# Patient Record
Sex: FEMALE | Race: WHITE | NOT HISPANIC OR LATINO | URBAN - METROPOLITAN AREA
[De-identification: names, ages, dates, MRNs, and addresses within clinical notes are randomized per-mention and may not be internally consistent; named-entity substitution may affect disease eponyms.]

---

## 2021-01-11 ENCOUNTER — OUTPATIENT (OUTPATIENT)
Dept: OUTPATIENT SERVICES | Facility: HOSPITAL | Age: 86
LOS: 1 days | End: 2021-01-11

## 2021-10-11 ENCOUNTER — APPOINTMENT (OUTPATIENT)
Dept: OPHTHALMOLOGY | Facility: CLINIC | Age: 86
End: 2021-10-11

## 2021-10-18 ENCOUNTER — NON-APPOINTMENT (OUTPATIENT)
Age: 86
End: 2021-10-18

## 2021-10-18 ENCOUNTER — APPOINTMENT (OUTPATIENT)
Dept: OPHTHALMOLOGY | Facility: CLINIC | Age: 86
End: 2021-10-18
Payer: MEDICARE

## 2021-10-18 PROCEDURE — 92014 COMPRE OPH EXAM EST PT 1/>: CPT

## 2021-10-18 PROCEDURE — 92134 CPTRZ OPH DX IMG PST SGM RTA: CPT

## 2022-04-25 ENCOUNTER — NON-APPOINTMENT (OUTPATIENT)
Age: 87
End: 2022-04-25

## 2022-04-25 ENCOUNTER — APPOINTMENT (OUTPATIENT)
Dept: OPHTHALMOLOGY | Facility: CLINIC | Age: 87
End: 2022-04-25
Payer: MEDICARE

## 2022-04-25 PROCEDURE — 92014 COMPRE OPH EXAM EST PT 1/>: CPT

## 2022-04-25 PROCEDURE — 92134 CPTRZ OPH DX IMG PST SGM RTA: CPT

## 2022-05-02 ENCOUNTER — APPOINTMENT (OUTPATIENT)
Dept: ORTHOPEDIC SURGERY | Facility: CLINIC | Age: 87
End: 2022-05-02
Payer: MEDICARE

## 2022-05-02 VITALS — WEIGHT: 182 LBS | BODY MASS INDEX: 33.49 KG/M2 | HEIGHT: 62 IN

## 2022-05-02 DIAGNOSIS — M25.539 PAIN IN UNSPECIFIED WRIST: ICD-10-CM

## 2022-05-02 DIAGNOSIS — E78.00 PURE HYPERCHOLESTEROLEMIA, UNSPECIFIED: ICD-10-CM

## 2022-05-02 DIAGNOSIS — Z78.9 OTHER SPECIFIED HEALTH STATUS: ICD-10-CM

## 2022-05-02 DIAGNOSIS — I10 ESSENTIAL (PRIMARY) HYPERTENSION: ICD-10-CM

## 2022-05-02 DIAGNOSIS — Z87.39 PERSONAL HISTORY OF OTHER DISEASES OF THE MUSCULOSKELETAL SYSTEM AND CONNECTIVE TISSUE: ICD-10-CM

## 2022-05-02 PROBLEM — Z00.00 ENCOUNTER FOR PREVENTIVE HEALTH EXAMINATION: Status: ACTIVE | Noted: 2022-05-02

## 2022-05-02 PROCEDURE — 20605 DRAIN/INJ JOINT/BURSA W/O US: CPT

## 2022-05-02 PROCEDURE — 99214 OFFICE O/P EST MOD 30 MIN: CPT | Mod: 25

## 2022-05-02 RX ORDER — SIMVASTATIN 40 MG/1
40 TABLET, FILM COATED ORAL
Qty: 90 | Refills: 0 | Status: ACTIVE | COMMUNITY
Start: 2022-03-05

## 2022-05-02 RX ORDER — FUROSEMIDE 40 MG/1
40 TABLET ORAL
Qty: 90 | Refills: 0 | Status: ACTIVE | COMMUNITY
Start: 2021-12-11

## 2022-05-02 RX ORDER — PANTOPRAZOLE 40 MG/1
40 TABLET, DELAYED RELEASE ORAL
Qty: 90 | Refills: 0 | Status: ACTIVE | COMMUNITY
Start: 2021-06-23

## 2022-05-02 RX ORDER — METOPROLOL SUCCINATE 200 MG/1
200 TABLET, EXTENDED RELEASE ORAL
Qty: 90 | Refills: 0 | Status: ACTIVE | COMMUNITY
Start: 2021-05-28

## 2022-05-02 RX ORDER — TERAZOSIN 2 MG/1
2 CAPSULE ORAL
Qty: 90 | Refills: 0 | Status: ACTIVE | COMMUNITY
Start: 2022-03-05

## 2022-05-02 NOTE — HISTORY OF PRESENT ILLNESS
[de-identified] : 87F, RHD, PMHX of Hypertension, Arthritis, presents with sudden onset of pain in the right wrist from 3/29/22. Denies injury/trauma. She reports ace wrapping the wrist/hand not knowing the cause of pain. Admits to some swelling. Has tried Tylenol w/ minimal relief. Admits to Ice w/o relief. Denies outside imaging/treatment.\par \par 5/2/22: f/u right wrist. Reports symptoms are getting minimally better - but still having some pain. Admits that if movement occurs, is writing, lifting items will have pain. at rest, starting to diminish. No constitutional symptoms. Admits medrol dose onesimo helped minimally.

## 2022-05-02 NOTE — IMAGING
[de-identified] : Right wrist with no swelling nor erythema. +ttp at thumb CMC/STT. -ttp fovea and SL interval. -ttp at radial styloid nor scaphoid. Able to make fist, oppose thumb to small finger and abduct fingers. Sensation intact throughout. <2sec cap refill.\par \par Right wrist radiographs with no fracture nor dislocation. Radiocarpal chondrocalcinosis. Advanced thumb CMC/STT arthrosis.

## 2022-05-02 NOTE — ASSESSMENT
[FreeTextEntry1] : Right thumb CMC/STT arthritis - reviewed radiographs. Discussed treatment ladder including NSAIDs, bracing (shown MetaGrip), hand therapy (actively doing with little relief), CSI and basal joint arthroplasty (discussed - patient will hold off as long as possible). After discussion of risks/benefits, patient elected to proceed with CSI to right thumb.\par \par Procedure 1 - 0.5cc 1%lidocaine + 0.5cc 40mg/cc Kenalog administered to right thumb CMC joint following sterilization with Betadine. Patient tolerated this well.\par \par F/u 6mo (previous pseudogout, seeing rheum)

## 2022-08-16 ENCOUNTER — NON-APPOINTMENT (OUTPATIENT)
Age: 87
End: 2022-08-16

## 2022-08-16 ENCOUNTER — APPOINTMENT (OUTPATIENT)
Dept: PULMONOLOGY | Facility: CLINIC | Age: 87
End: 2022-08-16

## 2022-08-16 VITALS
DIASTOLIC BLOOD PRESSURE: 68 MMHG | SYSTOLIC BLOOD PRESSURE: 114 MMHG | HEIGHT: 62 IN | HEART RATE: 74 BPM | OXYGEN SATURATION: 94 % | WEIGHT: 195 LBS | BODY MASS INDEX: 35.88 KG/M2 | TEMPERATURE: 97.6 F

## 2022-08-16 PROCEDURE — 94010 BREATHING CAPACITY TEST: CPT

## 2022-08-16 PROCEDURE — 99214 OFFICE O/P EST MOD 30 MIN: CPT | Mod: 25

## 2022-08-16 RX ORDER — GABAPENTIN 300 MG/1
300 CAPSULE ORAL
Qty: 60 | Refills: 0 | Status: ACTIVE | COMMUNITY
Start: 2022-05-17

## 2022-08-16 RX ORDER — ALLOPURINOL 100 MG/1
100 TABLET ORAL
Qty: 30 | Refills: 0 | Status: ACTIVE | COMMUNITY
Start: 2022-07-26

## 2022-08-16 NOTE — DISCUSSION/SUMMARY
[FreeTextEntry1] : This 11 Mojgan has mild acute bronchitis.  Her sputum is now clear but she still has shortness of breath and wheezing.  She is been on 2 course of antibiotics and I do not think a third course is indicated.  I have started her on a Medrol Dosepak.  I have also started her on Breo 100 mg 1 spray daily.  She will continue albuterol via nebulizer as needed.\par The patient understands and agrees with plan of care.\par Today's office visit encompassed 32 minutes. I conducted an extensive history ,physical exam and reviewed diagnosis and treatment options  including diagnostic tests,radiologic studies including  cat scans  and the use of prescription medication.

## 2022-08-16 NOTE — PROCEDURE
[FreeTextEntry1] : Spirometry performed 8/16/2022 reveals mild obstructive airways disease\par Chest x-ray performed July 2022 is normal

## 2022-08-16 NOTE — REASON FOR VISIT
[Initial] : an initial visit [Asthma] : asthma [Cough] : cough [Shortness of Breath] : shortness of breath [Wheezing] : wheezing

## 2022-08-16 NOTE — HISTORY OF PRESENT ILLNESS
[Former] : former [< 20 pack-years] : < 20 pack-years [Never] : never [TextBox_4] : 87 female hx asthma for fu visit\par for past 2 months white mucous initially yellow\par no fever no chest pain co sl pressure  and shortness of breath\par sl wheeze\par worsening dyspnea on exertion\par PMD  treated Z-Eduard and no improvement and doxycyline and no change\par no ill contacts\par precipitating factors none\par +vaccine covid\par

## 2022-08-16 NOTE — PHYSICAL EXAM
[No Acute Distress] : no acute distress [Normal Oropharynx] : normal oropharynx [Normal Appearance] : normal appearance [No Neck Mass] : no neck mass [Normal Rate/Rhythm] : normal rate/rhythm [Normal S1, S2] : normal s1, s2 [No Murmurs] : no murmurs [No Resp Distress] : no resp distress [Clear to Auscultation Bilaterally] : clear to auscultation bilaterally [No Abnormalities] : no abnormalities [Benign] : benign [Normal Gait] : normal gait [No Clubbing] : no clubbing [No Cyanosis] : no cyanosis [No Edema] : no edema [FROM] : FROM [Normal Color/ Pigmentation] : normal color/ pigmentation [No Focal Deficits] : no focal deficits [Oriented x3] : oriented x3 [Normal Affect] : normal affect [TextBox_68] : Faint expiratory wheezing bilaterally [TextBox_105] : Chronic edema lower extremities

## 2022-08-30 ENCOUNTER — APPOINTMENT (OUTPATIENT)
Dept: PULMONOLOGY | Facility: CLINIC | Age: 87
End: 2022-08-30

## 2022-08-30 VITALS
SYSTOLIC BLOOD PRESSURE: 114 MMHG | DIASTOLIC BLOOD PRESSURE: 70 MMHG | TEMPERATURE: 97.6 F | HEIGHT: 62 IN | WEIGHT: 195 LBS | BODY MASS INDEX: 35.88 KG/M2 | OXYGEN SATURATION: 93 % | HEART RATE: 87 BPM

## 2022-08-30 DIAGNOSIS — J20.9 ACUTE BRONCHITIS, UNSPECIFIED: ICD-10-CM

## 2022-08-30 PROCEDURE — 99214 OFFICE O/P EST MOD 30 MIN: CPT

## 2022-08-30 NOTE — REASON FOR VISIT
[Follow-Up] : a follow-up visit [Asthma] : asthma [Cough] : cough [Shortness of Breath] : shortness of breath [Wheezing] : wheezing [TextBox_44] : 2 weeks,

## 2022-08-30 NOTE — DISCUSSION/SUMMARY
[FreeTextEntry1] : Ms Can has residual cough and yellow phlegm.  Her lungs are clear and she is not having any other symptoms.  I prefer not to restart her on any inhalers.  I have asked her to take Mucinex DM 1 tablet twice a day until the symptoms resolved.  It is likely residual from a viral infection.\par The patient understands and agrees with plan of care.\par Today's office visit encompassed 32 minutes. I conducted an extensive history ,physical exam and reviewed diagnosis and treatment options  including diagnostic tests,radiologic studies including  cat scans  and the use of prescription medication.

## 2022-08-30 NOTE — HISTORY OF PRESENT ILLNESS
[Former] : former [< 20 pack-years] : < 20 pack-years [Never] : never [TextBox_4] : 87 female hx bronchiitis for fu \par still cough with yellow sputum \par no fever  no blood  no chest pain n tightness\par found sl improvement with  breo\par  marked improvement with prednisone

## 2022-10-17 ENCOUNTER — APPOINTMENT (OUTPATIENT)
Dept: OPHTHALMOLOGY | Facility: CLINIC | Age: 87
End: 2022-10-17

## 2022-10-17 ENCOUNTER — NON-APPOINTMENT (OUTPATIENT)
Age: 87
End: 2022-10-17

## 2022-10-17 PROCEDURE — 92014 COMPRE OPH EXAM EST PT 1/>: CPT

## 2022-10-17 PROCEDURE — 92134 CPTRZ OPH DX IMG PST SGM RTA: CPT

## 2022-11-29 ENCOUNTER — APPOINTMENT (OUTPATIENT)
Dept: PULMONOLOGY | Facility: CLINIC | Age: 87
End: 2022-11-29

## 2022-11-29 VITALS
HEART RATE: 75 BPM | HEIGHT: 62 IN | TEMPERATURE: 96.9 F | DIASTOLIC BLOOD PRESSURE: 68 MMHG | OXYGEN SATURATION: 96 % | BODY MASS INDEX: 35.88 KG/M2 | SYSTOLIC BLOOD PRESSURE: 128 MMHG | WEIGHT: 195 LBS

## 2022-11-29 PROCEDURE — 99214 OFFICE O/P EST MOD 30 MIN: CPT

## 2022-11-29 NOTE — HISTORY OF PRESENT ILLNESS
[TextBox_4] : 87 female recent admission to [facility] for left leg cellulitis and dyspnea and possible pneumonia\par treated abx and lasix x 1  and dced \par today feels better breathing improved on prednisone 50 mg qd for possible \par no fever  no chest pain no tightness no cough no phlegm\par full activity with walker\par no orthopnea\par

## 2022-11-29 NOTE — DISCUSSION/SUMMARY
[FreeTextEntry1] : Ms Can was recently admitted to the hospital for cellulitis and dyspnea.  CAT scan of the chest showed bilateral small patchy infiltrates.  Clear whether it was a atypical pneumonia versus pulmonary edema versus crypto genic organizing pneumonia.  The x-ray appearance is not that of pulmonary edema.  She did respond quickly to 1 dose of diuretics.  The patient was discharged on 50 mg of prednisone a day for possible cryptogenic organizing pneumonia.  This usually is intermittent infiltrates which moved to different areas of the lungs.  Is usually diagnosis of exclusion or pathologic diagnosis.  I have asked the patient to complete the 10-day course of prednisone.  She was given Symbicort which I do not think she needs and she will not use it.  She will also complete the oral antibiotics.  She return here in approxi-1 month's time with a chest x-ray to check on her status and see if the infiltrates have resolved.\par The patient understands and agrees with plan of care.\par Today's office visit encompassed 32 minutes. I conducted an extensive history ,physical exam and reviewed diagnosis and treatment options  including diagnostic tests,radiologic studies including  cat scans  and the use of prescription medication.

## 2022-11-29 NOTE — REASON FOR VISIT
[Follow-Up - From Hospitalization] : a follow-up visit after a recent hospitalization [TextBox_44] : Patient went to [facility] 11/23 and was discharged on 11/26. She had cellulitis. They discovered she had a breathing issue due to previously having pneumonia.

## 2022-11-30 ENCOUNTER — APPOINTMENT (OUTPATIENT)
Dept: CARE COORDINATION | Facility: HOME HEALTH | Age: 87
End: 2022-11-30

## 2022-11-30 DIAGNOSIS — J18.9 PNEUMONIA, UNSPECIFIED ORGANISM: ICD-10-CM

## 2022-11-30 DIAGNOSIS — Z95.0 PRESENCE OF CARDIAC PACEMAKER: ICD-10-CM

## 2022-11-30 DIAGNOSIS — L03.116 CELLULITIS OF LEFT LOWER LIMB: ICD-10-CM

## 2022-11-30 DIAGNOSIS — N18.30 CHRONIC KIDNEY DISEASE, STAGE 3 UNSPECIFIED: ICD-10-CM

## 2022-11-30 DIAGNOSIS — I50.32 CHRONIC DIASTOLIC (CONGESTIVE) HEART FAILURE: ICD-10-CM

## 2022-11-30 PROCEDURE — 99495 TRANSJ CARE MGMT MOD F2F 14D: CPT

## 2022-12-02 VITALS
DIASTOLIC BLOOD PRESSURE: 80 MMHG | HEART RATE: 81 BPM | RESPIRATION RATE: 16 BRPM | SYSTOLIC BLOOD PRESSURE: 140 MMHG | OXYGEN SATURATION: 93 %

## 2022-12-02 PROBLEM — N18.30 CKD (CHRONIC KIDNEY DISEASE), STAGE III: Status: ACTIVE | Noted: 2022-12-02

## 2022-12-02 PROBLEM — L03.116 CELLULITIS OF LEFT LOWER EXTREMITY: Status: ACTIVE | Noted: 2022-12-02

## 2022-12-02 PROBLEM — Z95.0 PRESENCE OF CARDIAC PACEMAKER: Status: ACTIVE | Noted: 2022-05-02

## 2022-12-02 PROBLEM — J18.9 PNEUMONIA: Status: ACTIVE | Noted: 2022-12-02

## 2022-12-02 PROBLEM — I50.32 CHRONIC DIASTOLIC CONGESTIVE HEART FAILURE: Status: ACTIVE | Noted: 2022-12-02

## 2022-12-02 RX ORDER — DOXYCYCLINE HYCLATE 100 MG/1
100 TABLET ORAL
Qty: 14 | Refills: 0 | Status: DISCONTINUED | COMMUNITY
Start: 2022-08-12 | End: 2022-12-02

## 2022-12-02 RX ORDER — METHYLPREDNISOLONE 4 MG/1
4 TABLET ORAL
Qty: 21 | Refills: 0 | Status: DISCONTINUED | COMMUNITY
Start: 2022-04-01 | End: 2022-12-02

## 2022-12-02 RX ORDER — AMOXICILLIN AND CLAVULANATE POTASSIUM 875; 125 MG/1; MG/1
875-125 TABLET, COATED ORAL
Qty: 14 | Refills: 0 | Status: COMPLETED | COMMUNITY
Start: 2022-03-25 | End: 2022-12-02

## 2022-12-02 RX ORDER — METHYLPREDNISOLONE 4 MG/1
4 TABLET ORAL
Qty: 1 | Refills: 1 | Status: COMPLETED | COMMUNITY
Start: 2022-08-16 | End: 2022-12-02

## 2022-12-02 NOTE — PHYSICAL EXAM
[No Acute Distress] : no acute distress [Well Nourished] : well nourished [Well Developed] : well developed [No Respiratory Distress] : no respiratory distress  [Clear to Auscultation] : lungs were clear to auscultation bilaterally [No Accessory Muscle Use] : no accessory muscle use [Normal Rate] : normal rate  [Regular Rhythm] : with a regular rhythm [Normal S1, S2] : normal S1 and S2 [No Murmur] : no murmur heard [Pedal Pulses Present] : the pedal pulses are present [No Edema] : there was no peripheral edema [Normal Gait] : normal gait [Coordination Grossly Intact] : coordination grossly intact [Speech Grossly Normal] : speech grossly normal [Normal Affect] : the affect was normal [Alert and Oriented x3] : oriented to person, place, and time [Normal Mood] : the mood was normal

## 2022-12-02 NOTE — HISTORY OF PRESENT ILLNESS
[Post-hospitalization from ___ Hospital] : Post-hospitalization from [unfilled] Hospital [Admitted on: ___] : The patient was admitted on [unfilled] [Discharged on ___] : discharged on [unfilled] [Discharge Summary] : discharge summary [Discharge Med List] : discharge medication list [Med Reconciliation] : medication reconciliation has been completed [Patient Contacted By: ____] : and contacted by [unfilled] [FreeTextEntry2] : Copied from EMR,"HOSPITAL COURSE: Pt is an 87 year old female with past medical history of HTN, HLD, CKD (stage 3), chronic LE swelling bilaterally (on Furosemide), pacemaker, Asthma (not on O2), presented to the hospital for cellulitis of the left leg and hypoxic respiratory failure. Additionally, she was found to have VICK (Cr on admission 1.71 from baseline of ~1.3-1.4) which improved to 1.5 range on discharge. She was started on IV Ceftriaxone and IV Doxycyline initially, then changed to PO Doxycyline per ID recommendations for a total 7 day course. Her wound appearance improved over the course of the hospital stay, with less erythema and purulence noticed since admission, and normalization of WBC. Patient also with mild hypoxia on admission, possibly a component of volume overload. Responded well to one dose of IV lasix and then transitioned to home po regimen. Cardio consulted. No need for repeat TTE this admission. Pulm consulted as well given findings CT chest findings. They suspected  and recommend prednisone 50 mg daily x 10 days and close follow up with pulmonologist. Will send home with maintenance inhaler as well. Patient determined to not require home supplemental O2.\par Patient should follow up with wound care in 2-3 days for dressing of wound."\par \par Pt seen in her home for transitional care management She denies SOB, fever chills LLE without redness, edema, drainage. No cough, wheezing or fatigue reported.

## 2022-12-02 NOTE — ASSESSMENT
[FreeTextEntry1] : Mayelin Can is being seen after discharge home from [facility]. She was admitted on 11/24/22   for PNA/cellulitis LLE and discharged home on 11/26/22.  Discharge medications were reviewed and reconciled with the current medication list and medications in home.\par \par 1)PNA-no cough, fever, chills lungs clr\par -on Doxycycline, Cefdinir, Prednisone\par \par [facility] TCM teaching: Advised patient to adhere to all medications including demonstrating proper use of inhalers and nebulizers. Encourage the use of proper breathing techniques such as pursed lip breathing or leaning forward during exhalation. Patient understands proper use of oxygen therapy. Increase activity as tolerated and maintain optimal activity levels. Continue coughing and deep breathing exercises including use of Incentive Spirometry. Receive routine pneumococcal and influenza vaccinations. Identify early signs and sx of disease and notify NP/MD. Maintain proper nutrition and hydration. Follow up with Pulmonologist within 7 days of discharge. Contact information given, patient advised to call with any questions/concerns. \par \par 2)Cellulitis LLE-resolved\par Area pink not tender to touch, no redness or edema\par No drainage from leg\par Completing Cefsinir, Azithromycin\par +PP, no pedal edema\par \par Reminded of TCM program and encouraged pt to call with any questions or concerns and especially with worsening of symptoms. Verbalized understanding.\par

## 2022-12-16 ENCOUNTER — TRANSCRIPTION ENCOUNTER (OUTPATIENT)
Age: 87
End: 2022-12-16

## 2022-12-22 ENCOUNTER — TRANSCRIPTION ENCOUNTER (OUTPATIENT)
Age: 87
End: 2022-12-22

## 2023-01-05 ENCOUNTER — RESULT REVIEW (OUTPATIENT)
Age: 88
End: 2023-01-05

## 2023-01-06 ENCOUNTER — APPOINTMENT (OUTPATIENT)
Dept: PULMONOLOGY | Facility: CLINIC | Age: 88
End: 2023-01-06
Payer: MEDICARE

## 2023-01-06 VITALS
DIASTOLIC BLOOD PRESSURE: 82 MMHG | SYSTOLIC BLOOD PRESSURE: 145 MMHG | WEIGHT: 195 LBS | BODY MASS INDEX: 35.88 KG/M2 | HEIGHT: 62 IN | HEART RATE: 72 BPM | OXYGEN SATURATION: 88 % | TEMPERATURE: 97.9 F

## 2023-01-06 DIAGNOSIS — R91.8 OTHER NONSPECIFIC ABNORMAL FINDING OF LUNG FIELD: ICD-10-CM

## 2023-01-06 DIAGNOSIS — R60.0 LOCALIZED EDEMA: ICD-10-CM

## 2023-01-06 PROCEDURE — 99215 OFFICE O/P EST HI 40 MIN: CPT

## 2023-01-06 RX ORDER — AZITHROMYCIN 250 MG/1
250 TABLET, FILM COATED ORAL
Qty: 6 | Refills: 0 | Status: DISCONTINUED | COMMUNITY
Start: 2022-03-05 | End: 2023-01-06

## 2023-01-06 RX ORDER — PREDNISONE 50 MG/1
50 TABLET ORAL
Qty: 10 | Refills: 0 | Status: DISCONTINUED | COMMUNITY
Start: 2022-11-26 | End: 2023-01-06

## 2023-01-06 RX ORDER — DOXYCYCLINE HYCLATE 100 MG/1
100 CAPSULE ORAL
Qty: 10 | Refills: 0 | Status: DISCONTINUED | COMMUNITY
Start: 2022-11-26 | End: 2023-01-06

## 2023-01-06 RX ORDER — AMOXICILLIN 500 MG/1
500 TABLET, FILM COATED ORAL
Qty: 16 | Refills: 0 | Status: DISCONTINUED | COMMUNITY
Start: 2022-11-01 | End: 2023-01-06

## 2023-01-06 RX ORDER — GABAPENTIN 100 MG/1
100 CAPSULE ORAL
Qty: 180 | Refills: 0 | Status: DISCONTINUED | COMMUNITY
Start: 2021-06-14 | End: 2023-01-06

## 2023-01-06 RX ORDER — NITROFURANTOIN (MONOHYDRATE/MACROCRYSTALS) 25; 75 MG/1; MG/1
100 CAPSULE ORAL
Qty: 90 | Refills: 0 | Status: DISCONTINUED | COMMUNITY
Start: 2022-07-19 | End: 2023-01-06

## 2023-01-06 RX ORDER — CEFDINIR 300 MG/1
300 CAPSULE ORAL
Qty: 14 | Refills: 0 | Status: DISCONTINUED | COMMUNITY
Start: 2022-05-16 | End: 2023-01-06

## 2023-01-06 RX ORDER — COLCHICINE 0.6 MG/1
0.6 CAPSULE ORAL
Qty: 10 | Refills: 0 | Status: DISCONTINUED | COMMUNITY
Start: 2021-10-25 | End: 2023-01-06

## 2023-01-06 NOTE — PROCEDURE
[FreeTextEntry1] : Chest x-ray [facility] January 5, 2023 no report as of yet.  My review diffuse interstitial alveolar opacities little change from prior study in November 2022.\par Room air O2 saturation at rest 88%.  With exercise 74%.  On 3 L nasal cannula 90% with exercise.

## 2023-01-06 NOTE — HISTORY OF PRESENT ILLNESS
[Former] : former [Never] : never [TextBox_4] : 87 female hx tobacco  50 yr ago\par recent hosp for cellulitis and pneumonia ? and started on steroids which completed\par today remains dyspnea \par some ankle welling and took extra diuretic\par   ++cough no phlegm but feel congested\par not on home o2 but notes o2 sat 82 after activity

## 2023-01-06 NOTE — REASON FOR VISIT
[Follow-Up] : a follow-up visit [Asthma] : asthma [Pneumonia] : pneumonia [Cough] : cough [Shortness of Breath] : shortness of breath [TextBox_44] : 6 weeks, Chest xray completed. Pt complains of SOB during very minimal activity.  During todays visit pt O2 dropped to 74% while walking to exam room.

## 2023-01-06 NOTE — DISCUSSION/SUMMARY
[FreeTextEntry1] : Ms Can has a progressive bilateral infiltrates shortness of breath and hypoxemia.  The etiology is unclear.  She was in the hospital and thought she could have underlying cryptogenic organizing pneumonia and was placed on prednisone.  When she was on the prednisone she was feeling better.  The patient is 87 and I think is a high risk patient for any invasive i.e. biopsy therapy.  She has significant edema at this time as well.  The patient's O2 sat at rest is 88 but decreases to 74 with activity on room air.  I have restarted the prednisone at 20 mg daily.  She has her underlying renal disease and we do not want affect her kidneys.  She is taking Lasix 40 mg a day and I have asked her to take an extra 20 mg a day for the next 3 days.  She should follow-up with Dr. Darvin riggins or renal physician in 1 week.  I recommend a follow-up cardiology evaluation including an echocardiogram by Dr. Davenport.  We will start her oxygen at 3 L nasal cannula.  I have asked her to return in 1 week's time.  The patient is quite upset about the deterioration in her overall respiratory status but hopefully we will come with some solutions.

## 2023-01-13 ENCOUNTER — APPOINTMENT (OUTPATIENT)
Dept: PULMONOLOGY | Facility: CLINIC | Age: 88
End: 2023-01-13
Payer: MEDICARE

## 2023-01-13 VITALS
DIASTOLIC BLOOD PRESSURE: 90 MMHG | TEMPERATURE: 97.4 F | SYSTOLIC BLOOD PRESSURE: 138 MMHG | WEIGHT: 194 LBS | OXYGEN SATURATION: 93 % | BODY MASS INDEX: 35.7 KG/M2 | HEART RATE: 93 BPM | HEIGHT: 62 IN

## 2023-01-13 DIAGNOSIS — R09.02 HYPOXEMIA: ICD-10-CM

## 2023-01-13 PROCEDURE — 99214 OFFICE O/P EST MOD 30 MIN: CPT

## 2023-01-13 NOTE — DISCUSSION/SUMMARY
[FreeTextEntry1] : Ms Can has dyspnea and hypoxemia.  Her chest ache shows diffuse interstitial alveolar changes.  She has known to parent for 32 years and her  is involved in pigeon breathing.  I am concerned she has hypersensitivity pneumonitis.  I have asked her to obtain hypersensitivity pneumonitis panel which includes precipitins to the Pedro allergy.  She is feeling better on the prednisone 20 mg a day and we will continue this medication.  She did desaturate to 80 on her last visit but she is refusing supplemental oxygen.  We will continue the prednisone 20 mg a day and reevaluate her situation in 1 month.  We will likely obtain a chest x-ray in the future to see if any improvement and I await the precipitin panel.\par The patient understands and agrees with the plan of care.\par Today's office visit encompassed 42 minutes. I conducted an extensive history , physical exam and reviewed diagnosis and treatment options  including diagnostic tests radiological studies including cat scans  and the use of prescription medication.

## 2023-01-13 NOTE — HISTORY OF PRESENT ILLNESS
[Never] : never [TextBox_4] : 87 female with progressive anne  and infiltrates\par ++parrot x 32 yrs\par on prednisone  and feels much no chest pain no tightness\par no wheeze  no cough no fever no nite awakening\par refusing to use supplemental o2\par

## 2023-01-13 NOTE — REASON FOR VISIT
[Follow-Up] : a follow-up visit [Asthma] : asthma [Spouse] : spouse [TextBox_44] : 1 week,Patient wants to go over chest X-ray results from 1/5/23,hypoxia, interstitial lung disease, pulmonary infiltrates

## 2023-01-19 ENCOUNTER — NON-APPOINTMENT (OUTPATIENT)
Age: 88
End: 2023-01-19

## 2023-01-27 ENCOUNTER — APPOINTMENT (OUTPATIENT)
Dept: ORTHOPEDIC SURGERY | Facility: CLINIC | Age: 88
End: 2023-01-27

## 2023-03-07 ENCOUNTER — APPOINTMENT (OUTPATIENT)
Dept: PULMONOLOGY | Facility: CLINIC | Age: 88
End: 2023-03-07
Payer: MEDICARE

## 2023-03-07 ENCOUNTER — NON-APPOINTMENT (OUTPATIENT)
Age: 88
End: 2023-03-07

## 2023-03-07 VITALS
TEMPERATURE: 97.2 F | WEIGHT: 194 LBS | HEIGHT: 62 IN | DIASTOLIC BLOOD PRESSURE: 80 MMHG | SYSTOLIC BLOOD PRESSURE: 150 MMHG | OXYGEN SATURATION: 95 % | HEART RATE: 90 BPM | BODY MASS INDEX: 35.7 KG/M2

## 2023-03-07 PROCEDURE — 94010 BREATHING CAPACITY TEST: CPT

## 2023-03-07 PROCEDURE — 99214 OFFICE O/P EST MOD 30 MIN: CPT | Mod: 25

## 2023-03-07 NOTE — DISCUSSION/SUMMARY
[FreeTextEntry1] : Ms. 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  Although precipitins were negative she has responded well to the prednisone.  She is currently taking 10 a day without difficulty.  I have asked her to decrease to 5 mg daily.  I have also asked her to repeat a chest x-ray for her next visit in 3 months.  She will not live without the bird.\par The patient understands and agrees with plan of care.\par Today's office visit encompassed 32 minutes. I conducted an extensive history ,physical exam and reviewed diagnosis and treatment options  including diagnostic tests,radiologic studies including  cat scans  and the use of prescription medication.  no discharge, no irritation, no pain, no redness, and no visual changes.

## 2023-03-07 NOTE — REASON FOR VISIT
[Follow-Up] : a follow-up visit [Asthma] : asthma [TextBox_44] : 1 month,no symptoms,hypoxia,interstitial lung disease,pulmonary infiltrates, pneumonia

## 2023-03-07 NOTE — HISTORY OF PRESENT ILLNESS
[Never] : never [TextBox_4] : 88 female with  dyspnea on exertion  and ?HP from bird\par feels good no sob no cough no chest pain no tightness\par remains on prednisone 10 mg  qd\par no fever  no wheeze \par still  own birds

## 2023-03-30 ENCOUNTER — APPOINTMENT (OUTPATIENT)
Dept: ORTHOPEDIC SURGERY | Facility: CLINIC | Age: 88
End: 2023-03-30
Payer: MEDICARE

## 2023-03-30 VITALS — WEIGHT: 194 LBS | HEIGHT: 62 IN | BODY MASS INDEX: 35.7 KG/M2

## 2023-03-30 DIAGNOSIS — M48.061 SPINAL STENOSIS, LUMBAR REGION WITHOUT NEUROGENIC CLAUDICATION: ICD-10-CM

## 2023-03-30 DIAGNOSIS — M51.37 OTHER INTERVERTEBRAL DISC DEGENERATION, LUMBOSACRAL REGION: ICD-10-CM

## 2023-03-30 DIAGNOSIS — M51.36 OTHER INTERVERTEBRAL DISC DEGENERATION, LUMBAR REGION: ICD-10-CM

## 2023-03-30 DIAGNOSIS — M48.07 SPINAL STENOSIS, LUMBOSACRAL REGION: ICD-10-CM

## 2023-03-30 DIAGNOSIS — M47.817 SPONDYLOSIS W/OUT MYELOPATHY OR RADICULOPATHY, LUMBOSACRAL REGION: ICD-10-CM

## 2023-03-30 DIAGNOSIS — G95.19 SPINAL STENOSIS, LUMBOSACRAL REGION: ICD-10-CM

## 2023-03-30 PROCEDURE — 99214 OFFICE O/P EST MOD 30 MIN: CPT

## 2023-03-30 RX ORDER — TRAMADOL HYDROCHLORIDE 50 MG/1
50 TABLET, COATED ORAL
Qty: 60 | Refills: 0 | Status: DISCONTINUED | COMMUNITY
Start: 2022-05-17 | End: 2023-03-30

## 2023-03-30 NOTE — HISTORY OF PRESENT ILLNESS
[Lower back] : lower back [8] : 8 [5] : 5 [Dull/Aching] : dull/aching [Radiating] : radiating [Shooting] : shooting [Tingling] : tingling [Constant] : constant [Household chores] : household chores [Leisure] : leisure [Nothing helps with pain getting better] : Nothing helps with pain getting better [Walking] : walking [de-identified] : Patient presents today with low back pain NKI for ~5 years. She reports that the pain is getting worse. pt reports she has difficulty walking. pt ambulates with a walker. pt has XR and CT of lumbar spine from  11/23/2022. Dr Nash sent her for imaging and she had multiple ESIs - she says last ABHINAV was 2019. pt reports numbness and tingling in BT LEs, left is worse than right. takes tylenol and gabapentin for pain [] : no [FreeTextEntry7] : bilateral legs [de-identified] : CT and XR from  11/2022

## 2023-03-30 NOTE — ASSESSMENT
[FreeTextEntry1] : Multilevel lumbar spondylosis \par Spondylolisthesis L4-S1 with stenosis \par 9 mm unstable L5-S1 anterolisthesis, increasing to 12 mm upon flexion\par \par Referral to pain management for injections, follow up 2 weeks after injection. \par \par Patient will begin physical therapy. \par \par Recommend: - NSAID - Heating pad - Muscle relaxer - Core strengthening exercise - Hamstring stretching exercise Patient is given back rehabilitation exercise book. \par \par Follow up in 2 months

## 2023-03-30 NOTE — DATA REVIEWED
[CT Scan] : CT scan [Lumbar Spine] : lumbar spine [Report was reviewed and noted in the chart] : The report was reviewed and noted in the chart [I independently reviewed and interpreted images and report] : I independently reviewed and interpreted images and report [FreeTextEntry1] : Multilevel lumbar spondylosis \par Spondylolisthesis L4-S1 with stenosis \par 9 mm unstable L5-S1 anterolisthesis, increasing to 12 mm upon flexion

## 2023-04-17 ENCOUNTER — APPOINTMENT (OUTPATIENT)
Dept: OPHTHALMOLOGY | Facility: CLINIC | Age: 88
End: 2023-04-17
Payer: MEDICARE

## 2023-04-17 ENCOUNTER — NON-APPOINTMENT (OUTPATIENT)
Age: 88
End: 2023-04-17

## 2023-04-17 PROCEDURE — 92014 COMPRE OPH EXAM EST PT 1/>: CPT

## 2023-04-17 PROCEDURE — 92134 CPTRZ OPH DX IMG PST SGM RTA: CPT

## 2023-04-25 ENCOUNTER — FORM ENCOUNTER (OUTPATIENT)
Age: 88
End: 2023-04-25

## 2023-06-01 ENCOUNTER — APPOINTMENT (OUTPATIENT)
Dept: PULMONOLOGY | Facility: CLINIC | Age: 88
End: 2023-06-01
Payer: MEDICARE

## 2023-06-01 VITALS
SYSTOLIC BLOOD PRESSURE: 138 MMHG | WEIGHT: 198 LBS | TEMPERATURE: 98.2 F | BODY MASS INDEX: 36.44 KG/M2 | HEART RATE: 67 BPM | OXYGEN SATURATION: 94 % | HEIGHT: 62 IN | DIASTOLIC BLOOD PRESSURE: 72 MMHG

## 2023-06-01 DIAGNOSIS — J20.9 ACUTE BRONCHITIS, UNSPECIFIED: ICD-10-CM

## 2023-06-01 DIAGNOSIS — J45.909 UNSPECIFIED ASTHMA, UNCOMPLICATED: ICD-10-CM

## 2023-06-01 PROCEDURE — 99214 OFFICE O/P EST MOD 30 MIN: CPT

## 2023-06-01 RX ORDER — ALBUTEROL SULFATE 90 UG/1
108 (90 BASE) INHALANT RESPIRATORY (INHALATION)
Refills: 0 | Status: ACTIVE | COMMUNITY

## 2023-06-01 RX ORDER — PREDNISONE 10 MG/1
10 TABLET ORAL DAILY
Qty: 60 | Refills: 1 | Status: DISCONTINUED | COMMUNITY
Start: 2023-01-06 | End: 2023-06-01

## 2023-06-01 NOTE — HISTORY OF PRESENT ILLNESS
[Never] : never [TextBox_4] : 88 female hx EAA bird on prednisone and tapered to 5 mg qd\par today feels poor heavy chest and bronchial congestion\par ++cough and started with pharyngitis and felt minimally better\par No  wheeze  ++ tightness

## 2023-06-01 NOTE — REASON FOR VISIT
[Follow-Up] : a follow-up visit [Asthma] : asthma [Cough] : cough [Shortness of Breath] : shortness of breath [ILD] : ILD [Spouse] : spouse [TextBox_44] : 2 month.  Patient was suppose to have an Xray but she was not feeling well.  2 weeks agoi she started ti have a sore throat.  She was treated with ZPack.  Sore throat got better and then she developed a wet yellow phlegm cough.

## 2023-06-01 NOTE — PHYSICAL EXAM
[No Acute Distress] : no acute distress [Normal Oropharynx] : normal oropharynx [Normal Appearance] : normal appearance [No Neck Mass] : no neck mass [Normal Rate/Rhythm] : normal rate/rhythm [Normal S1, S2] : normal s1, s2 [No Murmurs] : no murmurs [No Resp Distress] : no resp distress [Clear to Auscultation Bilaterally] : clear to auscultation bilaterally [No Abnormalities] : no abnormalities [Benign] : benign [Normal Gait] : normal gait [No Clubbing] : no clubbing [No Cyanosis] : no cyanosis [No Edema] : no edema [FROM] : FROM [Normal Color/ Pigmentation] : normal color/ pigmentation [No Focal Deficits] : no focal deficits [Oriented x3] : oriented x3 [Normal Affect] : normal affect [TextBox_68] : Good aeration with coarse wheezes and rhonchi will area

## 2023-06-01 NOTE — DISCUSSION/SUMMARY
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  Her and her  are both sick at this time and likely have a viral exacerbation of the symptoms.  She will continue using albuterol via nebulizer every 6 hours.  I have asked her to start prednisone 40 mg and taper by 5 mg every other day to off.\par The patient understands and agrees with plan of care.\par Today's office visit encompassed 32 minutes. I conducted an extensive history ,physical exam and reviewed diagnosis and treatment options  including diagnostic tests,radiologic studies including  cat scans  and the use of prescription medication.

## 2023-06-23 ENCOUNTER — APPOINTMENT (OUTPATIENT)
Dept: PULMONOLOGY | Facility: CLINIC | Age: 88
End: 2023-06-23
Payer: MEDICARE

## 2023-06-23 VITALS
HEART RATE: 64 BPM | SYSTOLIC BLOOD PRESSURE: 130 MMHG | HEIGHT: 62 IN | TEMPERATURE: 95.8 F | BODY MASS INDEX: 36.44 KG/M2 | DIASTOLIC BLOOD PRESSURE: 60 MMHG | WEIGHT: 198 LBS | OXYGEN SATURATION: 94 %

## 2023-06-23 DIAGNOSIS — J67.9 HYPERSENSITIVITY PNEUMONITIS DUE TO UNSPECIFIED ORGANIC DUST: ICD-10-CM

## 2023-06-23 DIAGNOSIS — J84.9 INTERSTITIAL PULMONARY DISEASE, UNSPECIFIED: ICD-10-CM

## 2023-06-23 PROCEDURE — 99214 OFFICE O/P EST MOD 30 MIN: CPT

## 2023-06-23 RX ORDER — PREDNISONE 5 MG/1
5 TABLET ORAL
Qty: 72 | Refills: 1 | Status: DISCONTINUED | COMMUNITY
Start: 2023-06-01 | End: 2023-06-23

## 2023-06-23 RX ORDER — PREDNISONE 5 MG/1
5 TABLET ORAL
Qty: 90 | Refills: 1 | Status: DISCONTINUED | COMMUNITY
Start: 2023-03-07 | End: 2023-06-23

## 2023-06-23 RX ORDER — CEFUROXIME AXETIL 500 MG/1
500 TABLET ORAL
Qty: 14 | Refills: 0 | Status: DISCONTINUED | COMMUNITY
Start: 2022-12-22 | End: 2023-06-23

## 2023-06-23 NOTE — REASON FOR VISIT
[Follow-Up] : a follow-up visit [Asthma] : asthma [Shortness of Breath] : shortness of breath [ILD] : ILD [Spouse] : spouse [TextBox_44] : 3 weeks. Pt states she has a little bit of SOB with walking/steps. Pt wants to know if she is supposed to continue with the prednisone. X-Ray still not performed.

## 2023-06-23 NOTE — DISCUSSION/SUMMARY
[FreeTextEntry1] : Ms. Kaur is feeling well at this time.  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  She is off the prednisone and is able to perform her activities of daily living.  I have asked her cardiologist to forward her most recent echocardiogram to check her right ventricular pressures.  At this time we will not use any further medication and follow-up her status in 3 months.  The patient understands and agrees with this plan of care.\par The patient understands and agrees with plan of care.\par Today's office visit encompassed 32 minutes. I conducted an extensive history ,physical exam and reviewed diagnosis and treatment options  including diagnostic tests,radiologic studies including  cat scans  and the use of prescription medication.

## 2023-06-23 NOTE — HISTORY OF PRESENT ILLNESS
[Never] : never [TextBox_4] : 88 female + EAA bird\par recent pred taper to off\par feels good today \par no chest pain no tightness \par no wheeze \par full activities of daily living limited by arthritis \par no albuterol neb use

## 2023-06-23 NOTE — PHYSICAL EXAM
[No Acute Distress] : no acute distress [Normal Oropharynx] : normal oropharynx [Normal Appearance] : normal appearance [No Neck Mass] : no neck mass [Normal Rate/Rhythm] : normal rate/rhythm [Normal S1, S2] : normal s1, s2 [No Murmurs] : no murmurs [No Resp Distress] : no resp distress [No Abnormalities] : no abnormalities [Benign] : benign [No Clubbing] : no clubbing [Normal Gait] : normal gait [No Cyanosis] : no cyanosis [No Edema] : no edema [FROM] : FROM [1+ Pitting] : 1+ pitting [Normal Color/ Pigmentation] : normal color/ pigmentation [No Focal Deficits] : no focal deficits [Oriented x3] : oriented x3 [Normal Affect] : normal affect [TextBox_68] : Coarse rales and rhonchi at bases clear upper fields

## 2023-07-19 ENCOUNTER — FORM ENCOUNTER (OUTPATIENT)
Age: 88
End: 2023-07-19

## 2023-09-22 ENCOUNTER — APPOINTMENT (OUTPATIENT)
Dept: PULMONOLOGY | Facility: CLINIC | Age: 88
End: 2023-09-22

## 2023-10-12 ENCOUNTER — APPOINTMENT (OUTPATIENT)
Dept: PULMONOLOGY | Facility: CLINIC | Age: 88
End: 2023-10-12
Payer: MEDICARE

## 2023-10-12 VITALS
SYSTOLIC BLOOD PRESSURE: 128 MMHG | HEART RATE: 89 BPM | WEIGHT: 195 LBS | OXYGEN SATURATION: 96 % | BODY MASS INDEX: 35.88 KG/M2 | DIASTOLIC BLOOD PRESSURE: 74 MMHG | TEMPERATURE: 94.6 F | HEIGHT: 62 IN

## 2023-10-12 DIAGNOSIS — J45.20 MILD INTERMITTENT ASTHMA, UNCOMPLICATED: ICD-10-CM

## 2023-10-12 PROCEDURE — 99214 OFFICE O/P EST MOD 30 MIN: CPT

## 2023-10-16 ENCOUNTER — APPOINTMENT (OUTPATIENT)
Dept: OPHTHALMOLOGY | Facility: CLINIC | Age: 88
End: 2023-10-16
Payer: MEDICARE

## 2023-10-16 ENCOUNTER — NON-APPOINTMENT (OUTPATIENT)
Age: 88
End: 2023-10-16

## 2023-10-16 PROCEDURE — 92014 COMPRE OPH EXAM EST PT 1/>: CPT

## 2023-10-16 PROCEDURE — 92134 CPTRZ OPH DX IMG PST SGM RTA: CPT

## 2024-04-25 ENCOUNTER — APPOINTMENT (OUTPATIENT)
Dept: ORTHOPEDIC SURGERY | Facility: CLINIC | Age: 89
End: 2024-04-25

## 2024-05-09 ENCOUNTER — APPOINTMENT (OUTPATIENT)
Dept: PULMONOLOGY | Facility: CLINIC | Age: 89
End: 2024-05-09

## 2024-07-29 ENCOUNTER — NON-APPOINTMENT (OUTPATIENT)
Age: 89
End: 2024-07-29

## 2024-07-29 ENCOUNTER — APPOINTMENT (OUTPATIENT)
Dept: OPHTHALMOLOGY | Facility: CLINIC | Age: 89
End: 2024-07-29
Payer: MEDICARE

## 2024-07-29 PROCEDURE — 92014 COMPRE OPH EXAM EST PT 1/>: CPT

## 2024-07-29 PROCEDURE — 92134 CPTRZ OPH DX IMG PST SGM RTA: CPT

## 2024-11-04 ENCOUNTER — APPOINTMENT (OUTPATIENT)
Dept: PULMONOLOGY | Facility: CLINIC | Age: 89
End: 2024-11-04
Payer: MEDICARE

## 2024-11-04 VITALS
SYSTOLIC BLOOD PRESSURE: 104 MMHG | HEART RATE: 66 BPM | BODY MASS INDEX: 35.88 KG/M2 | TEMPERATURE: 96.7 F | WEIGHT: 195 LBS | OXYGEN SATURATION: 96 % | DIASTOLIC BLOOD PRESSURE: 58 MMHG | HEIGHT: 62 IN

## 2024-11-04 DIAGNOSIS — Z87.891 PERSONAL HISTORY OF NICOTINE DEPENDENCE: ICD-10-CM

## 2024-11-04 DIAGNOSIS — J18.9 PNEUMONIA, UNSPECIFIED ORGANISM: ICD-10-CM

## 2024-11-04 DIAGNOSIS — J45.909 UNSPECIFIED ASTHMA, UNCOMPLICATED: ICD-10-CM

## 2024-11-04 DIAGNOSIS — J45.20 MILD INTERMITTENT ASTHMA, UNCOMPLICATED: ICD-10-CM

## 2024-11-04 PROCEDURE — 99214 OFFICE O/P EST MOD 30 MIN: CPT

## 2024-11-04 RX ORDER — NITROFURANTOIN MACROCRYSTAL 100 MG
100 CAPSULE ORAL
Refills: 0 | Status: ACTIVE | COMMUNITY

## 2024-12-16 ENCOUNTER — NON-APPOINTMENT (OUTPATIENT)
Age: 89
End: 2024-12-16

## 2024-12-23 ENCOUNTER — TRANSCRIPTION ENCOUNTER (OUTPATIENT)
Age: 89
End: 2024-12-23

## 2025-01-02 ENCOUNTER — NON-APPOINTMENT (OUTPATIENT)
Age: 89
End: 2025-01-02

## 2025-04-07 ENCOUNTER — APPOINTMENT (OUTPATIENT)
Dept: OPHTHALMOLOGY | Facility: CLINIC | Age: 89
End: 2025-04-07

## 2025-05-09 ENCOUNTER — APPOINTMENT (OUTPATIENT)
Dept: PULMONOLOGY | Facility: CLINIC | Age: 89
End: 2025-05-09

## 2025-05-26 ENCOUNTER — NON-APPOINTMENT (OUTPATIENT)
Age: 89
End: 2025-05-26

## 2025-05-28 ENCOUNTER — APPOINTMENT (OUTPATIENT)
Dept: OPHTHALMOLOGY | Facility: CLINIC | Age: 89
End: 2025-05-28

## 2025-06-11 ENCOUNTER — APPOINTMENT (OUTPATIENT)
Dept: OPHTHALMOLOGY | Facility: CLINIC | Age: 89
End: 2025-06-11

## 2025-07-02 ENCOUNTER — APPOINTMENT (OUTPATIENT)
Dept: OPHTHALMOLOGY | Facility: CLINIC | Age: 89
End: 2025-07-02